# Patient Record
Sex: FEMALE | URBAN - METROPOLITAN AREA
[De-identification: names, ages, dates, MRNs, and addresses within clinical notes are randomized per-mention and may not be internally consistent; named-entity substitution may affect disease eponyms.]

---

## 2021-03-04 ENCOUNTER — APPOINTMENT (RX ONLY)
Dept: URBAN - METROPOLITAN AREA CLINIC 24 | Facility: CLINIC | Age: 21
Setting detail: DERMATOLOGY
End: 2021-03-04

## 2021-03-04 DIAGNOSIS — Z41.9 ENCOUNTER FOR PROCEDURE FOR PURPOSES OTHER THAN REMEDYING HEALTH STATE, UNSPECIFIED: ICD-10-CM

## 2021-03-04 PROCEDURE — ? MEDICAL CONSULTATION: COOLSCULPTING

## 2021-03-04 NOTE — HPI: UNWANTED FAT
Are You Taking Immunosuppressants?: No
Have You Had Any Surgeries?: Yes
Additional History: * lost 15 lbs in the last 6 month. Fluctuates 7-8 lbs regularly. No matter how much weight is lost bat wings and abdomen still hold fat.
Surgery Details?: Breast augmentation, tonsillectomy